# Patient Record
Sex: FEMALE | URBAN - METROPOLITAN AREA
[De-identification: names, ages, dates, MRNs, and addresses within clinical notes are randomized per-mention and may not be internally consistent; named-entity substitution may affect disease eponyms.]

---

## 2020-09-28 ENCOUNTER — TELEPHONE (OUTPATIENT)
Dept: OBGYN CLINIC | Facility: CLINIC | Age: 35
End: 2020-09-28

## 2020-09-28 NOTE — TELEPHONE ENCOUNTER
Patient calling to transfer OB care  [place] 7 weeks  [facility]   Insurance [employer]  Good time to return phone call Any  Future Appointments   Date Time Provider Milad Mariee   10/14/2020 12:00 PM Kraig Silverio MD [facility] [place]         Pt is IVF transfe

## 2020-09-28 NOTE — TELEPHONE ENCOUNTER
New patient; gestational carrier; transferring from fertility; IVF pregnancy. [place]; history of twins that pt was a gestational carrier for as well. 2 children of her own. EDC 5/1/21; 7w1d today.   Pt had last appt with US today at fertility specialist.

## 2020-09-28 NOTE — TELEPHONE ENCOUNTER
Call forwarded from nurse to [facility] due to PT being scheduled too early by [address]    PT needs to be seen after 10/19/20 -  Per nurse    PT has [facility] not in this plan so she won't be able to schedule with our office    Appointment cancelled